# Patient Record
Sex: FEMALE | Race: WHITE
[De-identification: names, ages, dates, MRNs, and addresses within clinical notes are randomized per-mention and may not be internally consistent; named-entity substitution may affect disease eponyms.]

---

## 2017-01-04 PROBLEM — Z00.00 ENCOUNTER FOR PREVENTIVE HEALTH EXAMINATION: Status: ACTIVE | Noted: 2017-01-04

## 2017-02-02 ENCOUNTER — APPOINTMENT (OUTPATIENT)
Dept: RHEUMATOLOGY | Facility: CLINIC | Age: 82
End: 2017-02-02

## 2017-02-02 VITALS
SYSTOLIC BLOOD PRESSURE: 122 MMHG | DIASTOLIC BLOOD PRESSURE: 58 MMHG | HEIGHT: 60.5 IN | BODY MASS INDEX: 24.67 KG/M2 | WEIGHT: 129 LBS | HEART RATE: 86 BPM | TEMPERATURE: 97.9 F

## 2017-02-02 DIAGNOSIS — Z86.39 PERSONAL HISTORY OF OTHER ENDOCRINE, NUTRITIONAL AND METABOLIC DISEASE: ICD-10-CM

## 2017-02-02 DIAGNOSIS — Z82.3 FAMILY HISTORY OF STROKE: ICD-10-CM

## 2017-02-02 DIAGNOSIS — Z86.79 PERSONAL HISTORY OF OTHER DISEASES OF THE CIRCULATORY SYSTEM: ICD-10-CM

## 2017-02-02 DIAGNOSIS — Z78.9 OTHER SPECIFIED HEALTH STATUS: ICD-10-CM

## 2017-02-02 RX ORDER — LIDOCAINE HYDROCHLORIDE 10 MG/ML
1 INJECTION, SOLUTION INFILTRATION; PERINEURAL
Qty: 0 | Refills: 0 | Status: COMPLETED | OUTPATIENT
Start: 2017-02-02

## 2017-02-02 RX ORDER — DICLOFENAC SODIUM 20 MG/G
2 SOLUTION TOPICAL
Qty: 1 | Refills: 3 | Status: ACTIVE | COMMUNITY
Start: 2017-02-02 | End: 1900-01-01

## 2017-02-02 RX ORDER — METHYLPRED ACET/NACL,ISO-OS/PF 40 MG/ML
40 VIAL (ML) INJECTION
Qty: 1 | Refills: 0 | Status: COMPLETED | OUTPATIENT
Start: 2017-02-02

## 2017-02-02 RX ADMIN — METHYLPREDNISOLONE ACETATE 0 MG/ML: 40 INJECTION, SUSPENSION INTRA-ARTICULAR; INTRALESIONAL; INTRAMUSCULAR; SOFT TISSUE at 00:00

## 2017-02-02 RX ADMIN — LIDOCAINE HYDROCHLORIDE 0 %: 10 INJECTION, SOLUTION INFILTRATION; PERINEURAL at 00:00

## 2017-02-04 PROBLEM — Z82.3 FAMILY HISTORY OF CEREBROVASCULAR ACCIDENT (CVA): Status: ACTIVE | Noted: 2017-02-02

## 2017-02-04 PROBLEM — Z78.9 NON-SMOKER: Status: ACTIVE | Noted: 2017-02-02

## 2017-02-04 PROBLEM — Z86.79 HISTORY OF HYPERTENSION: Status: RESOLVED | Noted: 2017-02-04 | Resolved: 2017-02-04

## 2017-02-04 PROBLEM — Z86.39 HISTORY OF HYPERCHOLESTEROLEMIA: Status: RESOLVED | Noted: 2017-02-04 | Resolved: 2017-02-04

## 2017-04-06 ENCOUNTER — APPOINTMENT (OUTPATIENT)
Dept: RHEUMATOLOGY | Facility: CLINIC | Age: 82
End: 2017-04-06

## 2017-04-06 VITALS
DIASTOLIC BLOOD PRESSURE: 72 MMHG | TEMPERATURE: 97.7 F | HEIGHT: 60.5 IN | SYSTOLIC BLOOD PRESSURE: 132 MMHG | HEART RATE: 92 BPM

## 2017-04-06 RX ORDER — METHYLPRED ACET/NACL,ISO-OS/PF 40 MG/ML
40 VIAL (ML) INJECTION
Qty: 1 | Refills: 0 | Status: COMPLETED | OUTPATIENT
Start: 2017-04-06

## 2017-04-06 RX ORDER — LIDOCAINE HYDROCHLORIDE 10 MG/ML
1 INJECTION, SOLUTION INFILTRATION; PERINEURAL
Qty: 0 | Refills: 0 | Status: COMPLETED | OUTPATIENT
Start: 2017-04-06

## 2017-04-06 RX ADMIN — Medication 0 %: at 00:00

## 2017-04-06 RX ADMIN — METHYLPREDNISOLONE ACETATE 0 MG/ML: 40 INJECTION, SUSPENSION INTRA-ARTICULAR; INTRALESIONAL; INTRAMUSCULAR; SOFT TISSUE at 00:00

## 2017-07-06 ENCOUNTER — APPOINTMENT (OUTPATIENT)
Dept: RHEUMATOLOGY | Facility: CLINIC | Age: 82
End: 2017-07-06

## 2017-07-06 VITALS
BODY MASS INDEX: 24.92 KG/M2 | HEIGHT: 61 IN | TEMPERATURE: 98.5 F | OXYGEN SATURATION: 98 % | WEIGHT: 132 LBS | HEART RATE: 107 BPM | DIASTOLIC BLOOD PRESSURE: 76 MMHG | RESPIRATION RATE: 18 BRPM | SYSTOLIC BLOOD PRESSURE: 142 MMHG

## 2017-07-06 DIAGNOSIS — F15.90 OTHER STIMULANT USE, UNSPECIFIED, UNCOMPLICATED: ICD-10-CM

## 2017-07-07 PROBLEM — F15.90 CAFFEINE USE: Status: ACTIVE | Noted: 2017-02-02

## 2017-11-09 ENCOUNTER — APPOINTMENT (OUTPATIENT)
Dept: RHEUMATOLOGY | Facility: CLINIC | Age: 82
End: 2017-11-09
Payer: MEDICARE

## 2017-11-09 VITALS
HEART RATE: 106 BPM | HEIGHT: 61 IN | DIASTOLIC BLOOD PRESSURE: 68 MMHG | BODY MASS INDEX: 24.17 KG/M2 | SYSTOLIC BLOOD PRESSURE: 132 MMHG | RESPIRATION RATE: 16 BRPM | WEIGHT: 128 LBS | TEMPERATURE: 98.3 F | OXYGEN SATURATION: 97 %

## 2017-11-09 PROCEDURE — 20526 THER INJECTION CARP TUNNEL: CPT | Mod: LT

## 2017-11-09 PROCEDURE — 99214 OFFICE O/P EST MOD 30 MIN: CPT | Mod: 25

## 2017-11-09 RX ORDER — LIDOCAINE HYDROCHLORIDE 10 MG/ML
1 INJECTION, SOLUTION INFILTRATION; PERINEURAL
Qty: 0 | Refills: 0 | Status: COMPLETED | OUTPATIENT
Start: 2017-11-09

## 2017-11-09 RX ORDER — METHYLPRED ACET/NACL,ISO-OS/PF 40 MG/ML
40 VIAL (ML) INJECTION
Qty: 1 | Refills: 0 | Status: COMPLETED | OUTPATIENT
Start: 2017-11-09

## 2017-11-09 RX ADMIN — METHYLPREDNISOLONE ACETATE 0 MG/ML: 40 INJECTION, SUSPENSION INTRA-ARTICULAR; INTRALESIONAL; INTRAMUSCULAR; SOFT TISSUE at 00:00

## 2017-11-09 RX ADMIN — Medication 0 %: at 00:00

## 2018-03-15 ENCOUNTER — APPOINTMENT (OUTPATIENT)
Dept: RHEUMATOLOGY | Facility: CLINIC | Age: 83
End: 2018-03-15
Payer: MEDICARE

## 2018-03-15 VITALS
RESPIRATION RATE: 18 BRPM | HEART RATE: 102 BPM | WEIGHT: 135 LBS | DIASTOLIC BLOOD PRESSURE: 66 MMHG | OXYGEN SATURATION: 95 % | HEIGHT: 61 IN | SYSTOLIC BLOOD PRESSURE: 148 MMHG | TEMPERATURE: 98.3 F | BODY MASS INDEX: 25.49 KG/M2

## 2018-03-15 PROCEDURE — 99213 OFFICE O/P EST LOW 20 MIN: CPT

## 2018-08-09 ENCOUNTER — APPOINTMENT (OUTPATIENT)
Dept: RHEUMATOLOGY | Facility: CLINIC | Age: 83
End: 2018-08-09
Payer: MEDICARE

## 2018-08-09 VITALS
OXYGEN SATURATION: 96 % | TEMPERATURE: 98.3 F | RESPIRATION RATE: 16 BRPM | BODY MASS INDEX: 23.79 KG/M2 | HEIGHT: 61 IN | SYSTOLIC BLOOD PRESSURE: 143 MMHG | DIASTOLIC BLOOD PRESSURE: 71 MMHG | HEART RATE: 110 BPM | WEIGHT: 126 LBS

## 2018-08-09 PROCEDURE — 99214 OFFICE O/P EST MOD 30 MIN: CPT | Mod: 25

## 2018-08-09 PROCEDURE — 20526 THER INJECTION CARP TUNNEL: CPT | Mod: RT

## 2018-08-09 RX ORDER — METHYLPRED ACET/NACL,ISO-OS/PF 40 MG/ML
40 VIAL (ML) INJECTION
Qty: 1 | Refills: 0 | Status: COMPLETED | OUTPATIENT
Start: 2018-08-09 | End: 2018-08-09

## 2018-08-09 RX ORDER — METHYLPRED ACET/NACL,ISO-OS/PF 40 MG/ML
40 VIAL (ML) INJECTION
Qty: 1 | Refills: 0 | Status: COMPLETED | OUTPATIENT
Start: 2018-08-09

## 2018-08-09 RX ADMIN — METHYLPREDNISOLONE ACETATE 0 MG/ML: 40 INJECTION, SUSPENSION INTRA-ARTICULAR; INTRALESIONAL; INTRAMUSCULAR; SOFT TISSUE at 00:00

## 2018-08-09 RX ADMIN — Medication 0 MG/ML: at 00:00

## 2018-08-09 RX ADMIN — LIDOCAINE HYDROCHLORIDE 0 %: 10 INJECTION, SOLUTION INFILTRATION; PERINEURAL at 00:00

## 2018-08-09 RX ADMIN — Medication 0 %: at 00:00

## 2018-09-17 ENCOUNTER — APPOINTMENT (OUTPATIENT)
Dept: INTERNAL MEDICINE | Facility: CLINIC | Age: 83
End: 2018-09-17
Payer: MEDICARE

## 2018-09-17 VITALS
DIASTOLIC BLOOD PRESSURE: 85 MMHG | HEIGHT: 61 IN | BODY MASS INDEX: 23.98 KG/M2 | HEART RATE: 111 BPM | SYSTOLIC BLOOD PRESSURE: 150 MMHG | TEMPERATURE: 97.7 F | RESPIRATION RATE: 17 BRPM | OXYGEN SATURATION: 96 % | WEIGHT: 127 LBS

## 2018-09-17 DIAGNOSIS — Z83.3 FAMILY HISTORY OF DIABETES MELLITUS: ICD-10-CM

## 2018-09-17 DIAGNOSIS — M25.569 PAIN IN UNSPECIFIED KNEE: ICD-10-CM

## 2018-09-17 DIAGNOSIS — Z87.898 PERSONAL HISTORY OF OTHER SPECIFIED CONDITIONS: ICD-10-CM

## 2018-09-17 DIAGNOSIS — Z80.9 FAMILY HISTORY OF MALIGNANT NEOPLASM, UNSPECIFIED: ICD-10-CM

## 2018-09-17 DIAGNOSIS — H40.9 UNSPECIFIED GLAUCOMA: ICD-10-CM

## 2018-09-17 DIAGNOSIS — Z87.39 PERSONAL HISTORY OF OTHER DISEASES OF THE MUSCULOSKELETAL SYSTEM AND CONNECTIVE TISSUE: ICD-10-CM

## 2018-09-17 DIAGNOSIS — Z82.49 FAMILY HISTORY OF ISCHEMIC HEART DISEASE AND OTHER DISEASES OF THE CIRCULATORY SYSTEM: ICD-10-CM

## 2018-09-17 DIAGNOSIS — Z83.49 FAMILY HISTORY OF OTHER ENDOCRINE, NUTRITIONAL AND METABOLIC DISEASES: ICD-10-CM

## 2018-09-17 PROCEDURE — 99204 OFFICE O/P NEW MOD 45 MIN: CPT

## 2018-09-17 RX ORDER — EZETIMIBE 10 MG/1
10 TABLET ORAL
Refills: 0 | Status: DISCONTINUED | COMMUNITY

## 2018-09-17 RX ORDER — HYDROCHLOROTHIAZIDE 12.5 MG/1
12.5 CAPSULE ORAL DAILY
Qty: 30 | Refills: 0 | Status: ACTIVE | COMMUNITY
Start: 2017-04-06

## 2018-09-17 RX ORDER — ESOMEPRAZOLE MAGNESIUM 40 MG/1
40 CAPSULE, DELAYED RELEASE ORAL
Refills: 0 | Status: ACTIVE | COMMUNITY

## 2018-09-17 RX ORDER — NAPROXEN 500 MG/1
500 TABLET ORAL
Refills: 0 | Status: ACTIVE | COMMUNITY

## 2018-09-17 RX ORDER — VITAMIN B COMPLEX
CAPSULE ORAL
Refills: 0 | Status: ACTIVE | COMMUNITY

## 2018-09-17 RX ORDER — IBUPROFEN 200 MG/1
TABLET, COATED ORAL
Refills: 0 | Status: ACTIVE | COMMUNITY

## 2018-09-17 RX ORDER — CALCIUM CITRATE/VITAMIN D3 315MG-6.25
TABLET ORAL
Refills: 0 | Status: ACTIVE | COMMUNITY

## 2018-09-17 RX ORDER — METOPROLOL TARTRATE AND HYDROCHLOROTHIAZIDE 50; 25 MG/1; MG/1
TABLET ORAL
Refills: 0 | Status: DISCONTINUED | COMMUNITY

## 2018-09-17 RX ORDER — CHOLECALCIFEROL (VITAMIN D3) 25 MCG
TABLET ORAL
Refills: 0 | Status: ACTIVE | COMMUNITY

## 2018-09-17 RX ORDER — B-COMPLEX WITH VITAMIN C
TABLET ORAL
Refills: 0 | Status: ACTIVE | COMMUNITY

## 2018-09-17 RX ORDER — ROSUVASTATIN CALCIUM 40 MG/1
40 TABLET, FILM COATED ORAL
Qty: 30 | Refills: 5 | Status: ACTIVE | COMMUNITY
Start: 2018-09-17

## 2018-09-17 RX ORDER — METOPROLOL SUCCINATE 100 MG/1
100 TABLET, EXTENDED RELEASE ORAL DAILY
Qty: 90 | Refills: 2 | Status: ACTIVE | COMMUNITY
Start: 2018-09-17

## 2018-09-17 RX ORDER — TRAVOPROST 0.04 MG/ML
0 SOLUTION OPHTHALMIC
Refills: 0 | Status: ACTIVE | COMMUNITY

## 2018-09-17 RX ORDER — DM/PE/ACETAMINOPHEN/CHLORPHENR 10-5-325-2
TABLET, SEQUENTIAL ORAL
Refills: 0 | Status: ACTIVE | COMMUNITY

## 2018-09-17 RX ORDER — MAGNESIUM OXIDE 500 MG
500 TABLET ORAL
Refills: 0 | Status: ACTIVE | COMMUNITY

## 2018-09-18 NOTE — HEALTH RISK ASSESSMENT
[Good] : ~his/her~  mood as  good [No falls in past year] : Patient reported no falls in the past year [0] : 2) Feeling down, depressed, or hopeless: Not at all (0) [HIV Test offered] : HIV Test offered [Hepatitis C test offered] : Hepatitis C test offered [Fully functional (bathing, dressing, toileting, transferring, walking, feeding)] : Fully functional (bathing, dressing, toileting, transferring, walking, feeding) [Fully functional (using the telephone, shopping, preparing meals, housekeeping, doing laundry, using] : Fully functional and needs no help or supervision to perform IADLs (using the telephone, shopping, preparing meals, housekeeping, doing laundry, using transportation, managing medications and managing finances) [Discussed at today's visit] : Advance Directives Discussed at today's visit [] : No [[id number]] : 0 [Change in mental status noted] : No change in mental status noted [Language] : denies difficulty with language [Learning/Retaining New Information] : denies difficulty learning/retaining new information [Handling Complex Tasks] : denies difficulty handling complex tasks [Reasoning] : denies difficulty with reasoning [Spatial Ability and Orientation] : denies difficulty with spatial ability and orientation [None] : None [Alone] : lives alone [] :  [MammogramDate] : 01/09 [ColonoscopyDate] : 01/15

## 2018-09-18 NOTE — PHYSICAL EXAM
[No Acute Distress] : no acute distress [Well Nourished] : well nourished [Well Developed] : well developed [Well-Appearing] : well-appearing [Normal Sclera/Conjunctiva] : normal sclera/conjunctiva [Normal Outer Ear/Nose] : the outer ears and nose were normal in appearance [Normal Oropharynx] : the oropharynx was normal [Supple] : supple [No Lymphadenopathy] : no lymphadenopathy [Thyroid Normal, No Nodules] : the thyroid was normal and there were no nodules present [No Respiratory Distress] : no respiratory distress  [Clear to Auscultation] : lungs were clear to auscultation bilaterally [No Accessory Muscle Use] : no accessory muscle use [Normal Rate] : normal rate  [Regular Rhythm] : with a regular rhythm [Normal S1, S2] : normal S1 and S2 [No Murmur] : no murmur heard [No Edema] : there was no peripheral edema [Soft] : abdomen soft [Non Tender] : non-tender [Non-distended] : non-distended [No Masses] : no abdominal mass palpated [No HSM] : no HSM [Normal Bowel Sounds] : normal bowel sounds [Normal Posterior Cervical Nodes] : no posterior cervical lymphadenopathy [Normal Anterior Cervical Nodes] : no anterior cervical lymphadenopathy [No CVA Tenderness] : no CVA  tenderness [No Spinal Tenderness] : no spinal tenderness [No Rash] : no rash [Normal Gait] : normal gait [Coordination Grossly Intact] : coordination grossly intact [No Focal Deficits] : no focal deficits [Normal Affect] : the affect was normal [Alert and Oriented x3] : oriented to person, place, and time [Normal Insight/Judgement] : insight and judgment were intact

## 2018-09-18 NOTE — ASSESSMENT
[FreeTextEntry1] : 88 year-old woman with history of HTN, HLD, R CTS, DJD stable on the current regimen, diet and lifestyle modifications as counseled. She will forward prior results of her blood tests to the office. She will get her Flu vaccine at the pharmacy (not available in our office yet).  The plan as ordered.  F/u 3 months or sooner prn.

## 2018-09-18 NOTE — HISTORY OF PRESENT ILLNESS
[FreeTextEntry1] : RIYA COLON is a 88 year-old female who presents to establish medical care.  [de-identified] : 88F with h/o stable HTN, HLD< R carpal tunnel syndrome, DJD hands, knees presents to [facility] care.  She reports being in good general health.  Performs her ADLs on her own.  Stays active - shopping, goes to [employer] every Sunday.  She reports compliance with taking her medications. She denies chest pain, SOB, dizziness, weakness, palpitations, lightheadedness or syncope   \par \par PMH:\par 1.  HTN, HLD - saw her Cardiologist last week.  TTE, EKG, labs unremarkable. Takes Crestor 40 mg daily,  Metoprolol 100 mg daily, HCTZ 12.5 mg daily.  \par \par Shx - lives alone.   passed 12 years ago.  No children.  \par \par Providers:  Cardiologist Dr. Morales Richardson MD  [facility]  [phone]\par Ophthalmologist:    Dr. Cristhian Ochoa MD

## 2018-12-13 ENCOUNTER — APPOINTMENT (OUTPATIENT)
Dept: INTERNAL MEDICINE | Facility: CLINIC | Age: 83
End: 2018-12-13

## 2019-02-04 ENCOUNTER — APPOINTMENT (OUTPATIENT)
Dept: INTERNAL MEDICINE | Facility: CLINIC | Age: 84
End: 2019-02-04
Payer: MEDICARE

## 2019-02-04 VITALS
OXYGEN SATURATION: 95 % | WEIGHT: 129 LBS | BODY MASS INDEX: 24.35 KG/M2 | HEART RATE: 108 BPM | DIASTOLIC BLOOD PRESSURE: 82 MMHG | SYSTOLIC BLOOD PRESSURE: 162 MMHG | HEIGHT: 61 IN | TEMPERATURE: 97.7 F | RESPIRATION RATE: 17 BRPM

## 2019-02-04 DIAGNOSIS — E78.5 HYPERLIPIDEMIA, UNSPECIFIED: ICD-10-CM

## 2019-02-04 PROCEDURE — 99214 OFFICE O/P EST MOD 30 MIN: CPT

## 2019-02-04 NOTE — PHYSICAL EXAM
[No Acute Distress] : no acute distress [Well Nourished] : well nourished [Well Developed] : well developed [Well-Appearing] : well-appearing [Normal Sclera/Conjunctiva] : normal sclera/conjunctiva [Normal Outer Ear/Nose] : the outer ears and nose were normal in appearance [Supple] : supple [No Lymphadenopathy] : no lymphadenopathy [Thyroid Normal, No Nodules] : the thyroid was normal and there were no nodules present [No Respiratory Distress] : no respiratory distress  [Clear to Auscultation] : lungs were clear to auscultation bilaterally [No Accessory Muscle Use] : no accessory muscle use [Normal Rate] : normal rate  [Regular Rhythm] : with a regular rhythm [Normal S1, S2] : normal S1 and S2 [No Murmur] : no murmur heard [No Edema] : there was no peripheral edema [Soft] : abdomen soft [Non Tender] : non-tender [Non-distended] : non-distended [No Masses] : no abdominal mass palpated [No HSM] : no HSM [Normal Bowel Sounds] : normal bowel sounds [Normal Posterior Cervical Nodes] : no posterior cervical lymphadenopathy [Normal Anterior Cervical Nodes] : no anterior cervical lymphadenopathy [No CVA Tenderness] : no CVA  tenderness [No Spinal Tenderness] : no spinal tenderness [No Rash] : no rash [Coordination Grossly Intact] : coordination grossly intact [No Focal Deficits] : no focal deficits [Normal Affect] : the affect was normal [Alert and Oriented x3] : oriented to person, place, and time [Normal Insight/Judgement] : insight and judgment were intact [Normal Mood] : the mood was normal [de-identified] : ambulates with a came

## 2019-02-04 NOTE — ASSESSMENT
[FreeTextEntry1] : 89 year-old woman with history of HTN, HLD, R carpal tunnel syndrome, DJD hands, knees stable on the current regimen, diet and lifestyle modifications as counseled. Recent blood test results with her Cardiologist to be forwarded to our office.  She declines labs today.  She declines Flu vaccine today.  The plan as ordered.

## 2019-02-04 NOTE — HISTORY OF PRESENT ILLNESS
[de-identified] : 89F with h/o stable HTN, HLD,  R carpal tunnel syndrome, DJD hands, knees presents for medical follow-up.  She reports feeling well in general - denies chest pain, SOB, dizziness, weakness, palpitations, lightheadedness or syncope.  Stayed indoors mostly during recent cold weather.  Reports she has helpful neighbors who checked in on her.  Remained active while indoors walking down her hallway, inside the apartment.  Joint pain stable.  Appetite has been good, bowel movements regular, no recent falls.  She reports compliance with taking her medications.

## 2019-03-07 ENCOUNTER — APPOINTMENT (OUTPATIENT)
Dept: RHEUMATOLOGY | Facility: CLINIC | Age: 84
End: 2019-03-07

## 2019-04-04 ENCOUNTER — APPOINTMENT (OUTPATIENT)
Dept: RHEUMATOLOGY | Facility: CLINIC | Age: 84
End: 2019-04-04
Payer: MEDICARE

## 2019-04-04 VITALS
HEIGHT: 61 IN | BODY MASS INDEX: 24.17 KG/M2 | SYSTOLIC BLOOD PRESSURE: 144 MMHG | HEART RATE: 100 BPM | TEMPERATURE: 98 F | DIASTOLIC BLOOD PRESSURE: 81 MMHG | OXYGEN SATURATION: 96 % | RESPIRATION RATE: 17 BRPM | WEIGHT: 128 LBS

## 2019-04-04 DIAGNOSIS — M71.20 SYNOVIAL CYST OF POPLITEAL SPACE [BAKER], UNSPECIFIED KNEE: ICD-10-CM

## 2019-04-04 PROCEDURE — 99214 OFFICE O/P EST MOD 30 MIN: CPT | Mod: 25

## 2019-04-04 PROCEDURE — 76881 US COMPL JOINT R-T W/IMG: CPT | Mod: LT

## 2019-04-05 NOTE — REVIEW OF SYSTEMS
[Lower Ext Edema] : lower extremity edema [Arthralgias] : arthralgias [Joint Pain] : joint pain [Joint Stiffness] : joint stiffness [Sleep Disturbances] : sleep disturbances [Fever] : no fever [Chills] : no chills [Eye Pain] : no eye pain [Palpitations] : no palpitations [SOB on Exertion] : no shortness of breath during exertion [Heartburn] : no heartburn [Joint Swelling] : no joint swelling [Skin Lesions] : no skin lesions [Depression] : no depression [Feelings Of Weakness] : no feelings of weakness [Easy Bleeding] : no tendency for easy bleeding [Easy Bruising] : no tendency for easy bruising [de-identified] : intermittent instability

## 2019-04-05 NOTE — HISTORY OF PRESENT ILLNESS
[FreeTextEntry1] : Patient reports worsening pain over the left knee over the last 2 weeks. She reports instability symptoms especially after walking one block which is unusual for her due to her activity nature.   She does not recall inciting factors or change in diet. She reports intermittent stiffness and mild fullness of the knee.  She reports arnica cream as landing mild relief with intermittent use of Advil helps.  \par After receiving cortisone injections to the wrist for carpal tunnel, she does not note accompanying paresthesias.  She does note stiffness over the digits especially first thing in the morning last ing a few mins.  She notes this does not interfere with performing continuous household chores in the home including wringing out hand washed clothes and sweeping. She otherwise denies motor/sensory disturbances or fevers.\par

## 2019-04-05 NOTE — ASSESSMENT
[FreeTextEntry1] : Patient with DJD of the knees and hands;  improved Carpal Tunnel syndrome; r/o CPPD:\par \par Patient will benefit from continued physical therapy at home to help with joint mobility and muscle strengthening of the knees.  Quadriceps strengthening exercises demonstrated in the office.   Viscosupplementation has been encouraged to provide additional lubrication and joint support.  US visualized popliteal cyst; patient would like to defer arthrocentesis, rec NSAID use for seven days and ice compressions.  Crystal disease not detected on US today.    Knee films rec.  Risks and benefits were discussed and include but not limited to renal/hepatic damage and GI ulceration and bleeding. Advised to take with food to limit stomach upset as well as warned to stop the medication if worsening gastric pain, black/red vomit, black/red stools or any other medical concern.\par Patient advised continued wrist splint for joint stabilization.  Vitamin supplementation including Magnesium, Zinc and Calcium have been recommended. Wrist splint recommended.Paraffin wax recommended for hand OA along with Glucosamine supplements/  \par She is in agreement with the above plan and will return in four months' time.\par \par \par \par

## 2019-04-05 NOTE — PROCEDURE
[Betadine] : betadine solution [25 gauge 1 inch] : A 25 gauge 1 inch needle was used [Depomedrol ___ mg] : Depomedrol [unfilled] mg [___ml 1% Lidocaine] : [unfilled] ml of 1% lidocaine [FreeTextEntry1] : DATE: 4/419\par Study Type: Complete study\par Indication: sudden pain in joint \par Study Site: Left knee\par Equipment:  Sonosite 10-5 MHz linear transducer  \par Findings: Orthogonal views of the anterior, posterior, medial and lateral\par aspects of the knee were obtained in grey scale and\par  There was hypoechoic pocket in the suprapatellar fossa in long and short  axis with \par cortical irregularities , marginal osteophytes and joint space narrowing.  No muscle or tendon or tendon sheath abnormalities seen in the anterior compartments. Moderate sized cyst appreciated over posterior compartment with semimembranous and gastrocnemius head intact.  \par Impressions: Severe Degenerative Joint Disease. No specific findings to suggest crystal related arthritis.\par

## 2019-04-05 NOTE — PHYSICAL EXAM
[General Appearance - Alert] : alert [General Appearance - In No Acute Distress] : in no acute distress [Sclera] : the sclera and conjunctiva were normal [Oropharynx] : the oropharynx was normal [Respiration, Rhythm And Depth] : normal respiratory rhythm and effort [Heart Rate And Rhythm] : heart rate was normal and rhythm regular [Edema] : there was no peripheral edema [No Spinal Tenderness] : no spinal tenderness [Musculoskeletal - Swelling] : no joint swelling seen [Motor Tone] : muscle strength and tone were normal [] : no rash [Motor Exam] : the motor exam was normal [Oriented To Time, Place, And Person] : oriented to person, place, and time [Impaired Insight] : insight and judgment were intact [FreeTextEntry1] : +Tinel/Phalen testing

## 2019-11-14 ENCOUNTER — APPOINTMENT (OUTPATIENT)
Dept: RHEUMATOLOGY | Facility: CLINIC | Age: 84
End: 2019-11-14
Payer: MEDICARE

## 2019-11-14 VITALS
RESPIRATION RATE: 16 BRPM | TEMPERATURE: 97.6 F | WEIGHT: 122 LBS | HEART RATE: 121 BPM | HEIGHT: 61 IN | DIASTOLIC BLOOD PRESSURE: 75 MMHG | OXYGEN SATURATION: 93 % | SYSTOLIC BLOOD PRESSURE: 161 MMHG | BODY MASS INDEX: 23.03 KG/M2

## 2019-11-14 DIAGNOSIS — G56.00 CARPAL TUNNEL SYNDROME, UNSPECIFIED UPPER LIMB: ICD-10-CM

## 2019-11-14 PROCEDURE — 99213 OFFICE O/P EST LOW 20 MIN: CPT

## 2019-11-14 NOTE — REVIEW OF SYSTEMS
[Fever] : no fever [Chills] : no chills [Eye Pain] : no eye pain [Palpitations] : no palpitations [Lower Ext Edema] : lower extremity edema [SOB on Exertion] : no shortness of breath during exertion [Heartburn] : no heartburn [Arthralgias] : arthralgias [Joint Pain] : joint pain [Joint Swelling] : no joint swelling [Joint Stiffness] : joint stiffness [Skin Lesions] : no skin lesions [Sleep Disturbances] : sleep disturbances [Feelings Of Weakness] : no feelings of weakness [Depression] : no depression [Easy Bruising] : no tendency for easy bruising [Easy Bleeding] : no tendency for easy bleeding [de-identified] : intermittent instability

## 2019-11-14 NOTE — PHYSICAL EXAM
[General Appearance - Alert] : alert [General Appearance - In No Acute Distress] : in no acute distress [Sclera] : the sclera and conjunctiva were normal [Oropharynx] : the oropharynx was normal [Respiration, Rhythm And Depth] : normal respiratory rhythm and effort [Heart Rate And Rhythm] : heart rate was normal and rhythm regular [Edema] : there was no peripheral edema [No Spinal Tenderness] : no spinal tenderness [Musculoskeletal - Swelling] : no joint swelling seen [Motor Tone] : muscle strength and tone were normal [FreeTextEntry1] : Scattered Heberden and Krys nodes, epicondyles without tenderness, mild restriction of lateral neck rotation, left > right, knees with mild tenderness along joint lines with valgus deformity b/l with appropriate external rotation of the hips appreciated.  [] : no rash [Motor Exam] : the motor exam was normal [Oriented To Time, Place, And Person] : oriented to person, place, and time [Impaired Insight] : insight and judgment were intact

## 2019-11-14 NOTE — HISTORY OF PRESENT ILLNESS
[FreeTextEntry1] : Patient reports improvement in knee pain since the last few encounters. Though she explains intermittent instability symptoms especially after walking for prolonged periods; she reports however having applied topical creams as well as taking a vegetarian based a vitamin which has additionally addressed tingling and paresthesias experienced in the hands. She explains having celebrated her recent 89th birthday and finds no new complaints as she is performing ADLs without difficulty as well as ambulating on a daily basis, at a moderate pace with the assistance of a cane.  She otherwise denies motor/sensory disturbances or fevers.\par

## 2019-11-14 NOTE — ASSESSMENT
[FreeTextEntry1] : Patient with DJD of the knees and hands;  improved Carpal Tunnel syndrome; \par Patient will benefit from continued physical therapy at home to help with joint mobility and muscle strengthening of the knees.  Quadriceps strengthening exercises demonstrated in the office.   Viscosupplementation has been encouraged to provide additional lubrication and joint support.  \par Patient advised continued wrist splint for joint stabilization if with resurfacing of pain.  Vitamin supplementation including Magnesium, Zinc and Calcium have been recommended. Wrist splint recommended.Paraffin wax recommended for hand OA along with Glucosamine supplements/  \par She is in agreement with the above plan and will return in four months' time.\par \par \par \par

## 2020-03-12 ENCOUNTER — APPOINTMENT (OUTPATIENT)
Dept: RHEUMATOLOGY | Facility: CLINIC | Age: 85
End: 2020-03-12

## 2021-02-11 ENCOUNTER — APPOINTMENT (OUTPATIENT)
Dept: RHEUMATOLOGY | Facility: CLINIC | Age: 86
End: 2021-02-11

## 2021-03-04 ENCOUNTER — APPOINTMENT (OUTPATIENT)
Dept: RHEUMATOLOGY | Facility: CLINIC | Age: 86
End: 2021-03-04
Payer: MEDICARE

## 2021-03-04 VITALS
HEIGHT: 61 IN | OXYGEN SATURATION: 99 % | RESPIRATION RATE: 17 BRPM | SYSTOLIC BLOOD PRESSURE: 122 MMHG | TEMPERATURE: 98.1 F | WEIGHT: 108 LBS | DIASTOLIC BLOOD PRESSURE: 71 MMHG | BODY MASS INDEX: 20.39 KG/M2 | HEART RATE: 129 BPM

## 2021-03-04 DIAGNOSIS — M17.0 BILATERAL PRIMARY OSTEOARTHRITIS OF KNEE: ICD-10-CM

## 2021-03-04 DIAGNOSIS — R60.9 EDEMA, UNSPECIFIED: ICD-10-CM

## 2021-03-04 DIAGNOSIS — M19.049 PRIMARY OSTEOARTHRITIS, UNSPECIFIED HAND: ICD-10-CM

## 2021-03-04 DIAGNOSIS — L30.9 DERMATITIS, UNSPECIFIED: ICD-10-CM

## 2021-03-04 DIAGNOSIS — I10 ESSENTIAL (PRIMARY) HYPERTENSION: ICD-10-CM

## 2021-03-04 PROCEDURE — 99072 ADDL SUPL MATRL&STAF TM PHE: CPT

## 2021-03-04 PROCEDURE — 99214 OFFICE O/P EST MOD 30 MIN: CPT

## 2021-03-04 RX ORDER — HYDROCHLOROTHIAZIDE 12.5 MG/1
12.5 CAPSULE ORAL DAILY
Qty: 30 | Refills: 0 | Status: ACTIVE | COMMUNITY
Start: 2021-03-04 | End: 1900-01-01

## 2021-03-04 RX ORDER — LANOLIN ALCOHOL/MO/W.PET/CERES
CREAM (GRAM) TOPICAL
Qty: 1 | Refills: 6 | Status: ACTIVE | COMMUNITY
Start: 2021-03-04 | End: 1900-01-01

## 2021-03-04 RX ORDER — CEPHALEXIN 250 MG/1
250 TABLET ORAL
Qty: 10 | Refills: 0 | Status: ACTIVE | COMMUNITY
Start: 2021-03-04 | End: 1900-01-01

## 2021-03-07 PROBLEM — M19.049 OSTEOARTHRITIS, HAND: Status: ACTIVE | Noted: 2018-03-15

## 2021-03-07 PROBLEM — M17.0 OSTEOARTHRITIS OF BOTH KNEES: Status: ACTIVE | Noted: 2017-02-02

## 2021-03-07 PROBLEM — L30.9 DERMATITIS: Status: ACTIVE | Noted: 2021-03-04

## 2021-03-07 NOTE — ASSESSMENT
[FreeTextEntry1] : Patient with DJD of the knees and hands;  LE swelling: \par Rec diuretic for LE swelling and HTN.  Spoke with Ophthalmology to confirm no drug reaction with glaucoma medications.  Short course of Keflex given for bright LE erythema in elderly patient along with Eucerin + 30% urea for moisturization.  Patient will benefit from continued physical therapy at home to help with joint mobility and muscle strengthening of the knees.  Quadriceps strengthening exercises demonstrated in the office.   Viscosupplementation has been encouraged to provide additional lubrication and joint support.  \par Paraffin wax recommended for hand OA along with Glucosamine supplements. \par PCP referral given as well. \par She is in agreement with the above plan and will return in four months' time.\par \par \par \par

## 2021-03-07 NOTE — REVIEW OF SYSTEMS
[Fever] : no fever [Chills] : no chills [Eye Pain] : no eye pain [Palpitations] : no palpitations [Lower Ext Edema] : lower extremity edema [SOB on Exertion] : no shortness of breath during exertion [Heartburn] : no heartburn [Arthralgias] : arthralgias [Joint Pain] : joint pain [Joint Swelling] : no joint swelling [Joint Stiffness] : joint stiffness [Skin Lesions] : no skin lesions [Sleep Disturbances] : sleep disturbances [Depression] : no depression [Feelings Of Weakness] : no feelings of weakness [Easy Bleeding] : no tendency for easy bleeding [Easy Bruising] : no tendency for easy bruising [de-identified] : intermittent instability

## 2021-03-07 NOTE — PHYSICAL EXAM
[General Appearance - Alert] : alert [General Appearance - In No Acute Distress] : in no acute distress [Sclera] : the sclera and conjunctiva were normal [] : the neck was supple [Respiration, Rhythm And Depth] : normal respiratory rhythm and effort [Heart Rate And Rhythm] : heart rate was normal and rhythm regular [No Spinal Tenderness] : no spinal tenderness [Musculoskeletal - Swelling] : no joint swelling seen [Motor Tone] : muscle strength and tone were normal [FreeTextEntry1] : erythema over the shins b/l ; xerotic [Motor Exam] : the motor exam was normal [Oriented To Time, Place, And Person] : oriented to person, place, and time [Impaired Insight] : insight and judgment were intact

## 2021-03-07 NOTE — HISTORY OF PRESENT ILLNESS
[FreeTextEntry1] : Patient reports LE swelling and rash, lending to difficulty with mobility.  She reports rash as pruritic.  She explains having stopped diuretic many months ago .  She reports having to use a walker for the first time long with assistance from an aide.    She denies fevers or chills or trauma to the LE  area.   She otherwise denies motor/sensory disturbances or fevers.\par

## 2021-10-06 PROBLEM — I10 ESSENTIAL HYPERTENSION: Status: ACTIVE | Noted: 2018-09-17

## 2025-01-01 ENCOUNTER — INPATIENT (INPATIENT)
Facility: HOSPITAL | Age: 89
LOS: 1 days | DRG: 293 | End: 2025-03-16
Attending: HOSPITALIST | Admitting: INTERNAL MEDICINE
Payer: MEDICARE

## 2025-01-01 VITALS
OXYGEN SATURATION: 99 % | DIASTOLIC BLOOD PRESSURE: 67 MMHG | WEIGHT: 87.08 LBS | RESPIRATION RATE: 22 BRPM | HEIGHT: 61 IN | HEART RATE: 148 BPM | SYSTOLIC BLOOD PRESSURE: 97 MMHG

## 2025-01-01 VITALS
TEMPERATURE: 97 F | HEART RATE: 106 BPM | OXYGEN SATURATION: 90 % | SYSTOLIC BLOOD PRESSURE: 88 MMHG | RESPIRATION RATE: 16 BRPM | DIASTOLIC BLOOD PRESSURE: 49 MMHG

## 2025-01-01 DIAGNOSIS — I50.23 ACUTE ON CHRONIC SYSTOLIC (CONGESTIVE) HEART FAILURE: ICD-10-CM

## 2025-01-01 DIAGNOSIS — N17.9 ACUTE KIDNEY FAILURE, UNSPECIFIED: ICD-10-CM

## 2025-01-01 DIAGNOSIS — Z29.9 ENCOUNTER FOR PROPHYLACTIC MEASURES, UNSPECIFIED: ICD-10-CM

## 2025-01-01 DIAGNOSIS — I50.9 HEART FAILURE, UNSPECIFIED: ICD-10-CM

## 2025-01-01 DIAGNOSIS — I48.91 UNSPECIFIED ATRIAL FIBRILLATION: ICD-10-CM

## 2025-01-01 DIAGNOSIS — Z51.5 ENCOUNTER FOR PALLIATIVE CARE: ICD-10-CM

## 2025-01-01 DIAGNOSIS — R74.01 ELEVATION OF LEVELS OF LIVER TRANSAMINASE LEVELS: ICD-10-CM

## 2025-01-01 DIAGNOSIS — E87.8 OTHER DISORDERS OF ELECTROLYTE AND FLUID BALANCE, NOT ELSEWHERE CLASSIFIED: ICD-10-CM

## 2025-01-01 DIAGNOSIS — A41.9 SEPSIS, UNSPECIFIED ORGANISM: ICD-10-CM

## 2025-01-01 LAB
ALBUMIN SERPL ELPH-MCNC: 3.2 G/DL — LOW (ref 3.5–5)
ALBUMIN SERPL ELPH-MCNC: 3.3 G/DL — LOW (ref 3.5–5)
ALP SERPL-CCNC: 293 U/L — HIGH (ref 40–120)
ALP SERPL-CCNC: 309 U/L — HIGH (ref 40–120)
ALT FLD-CCNC: 1057 U/L DA — HIGH (ref 10–60)
ALT FLD-CCNC: 1648 U/L DA — HIGH (ref 10–60)
ANION GAP SERPL CALC-SCNC: 8 MMOL/L — SIGNIFICANT CHANGE UP (ref 5–17)
ANION GAP SERPL CALC-SCNC: 9 MMOL/L — SIGNIFICANT CHANGE UP (ref 5–17)
APPEARANCE UR: CLEAR — SIGNIFICANT CHANGE UP
APTT BLD: 30.7 SEC — SIGNIFICANT CHANGE UP (ref 24.5–35.6)
AST SERPL-CCNC: 1372 U/L — HIGH (ref 10–40)
AST SERPL-CCNC: 743 U/L — HIGH (ref 10–40)
BACTERIA # UR AUTO: ABNORMAL /HPF
BASE EXCESS BLDA CALC-SCNC: -4.5 MMOL/L — LOW (ref -2–3)
BASOPHILS # BLD AUTO: 0.02 K/UL — SIGNIFICANT CHANGE UP (ref 0–0.2)
BASOPHILS NFR BLD AUTO: 0.2 % — SIGNIFICANT CHANGE UP (ref 0–2)
BILIRUB DIRECT SERPL-MCNC: 1.3 MG/DL — HIGH (ref 0–0.3)
BILIRUB SERPL-MCNC: 2 MG/DL — HIGH (ref 0.2–1.2)
BILIRUB SERPL-MCNC: 2.5 MG/DL — HIGH (ref 0.2–1.2)
BILIRUB UR-MCNC: NEGATIVE — SIGNIFICANT CHANGE UP
BLOOD GAS COMMENTS ARTERIAL: SIGNIFICANT CHANGE UP
BUN SERPL-MCNC: 77 MG/DL — HIGH (ref 7–18)
BUN SERPL-MCNC: 78 MG/DL — HIGH (ref 7–18)
CALCIUM SERPL-MCNC: 9.1 MG/DL — SIGNIFICANT CHANGE UP (ref 8.4–10.5)
CALCIUM SERPL-MCNC: 9.3 MG/DL — SIGNIFICANT CHANGE UP (ref 8.4–10.5)
CHLORIDE SERPL-SCNC: 97 MMOL/L — SIGNIFICANT CHANGE UP (ref 96–108)
CHLORIDE SERPL-SCNC: 99 MMOL/L — SIGNIFICANT CHANGE UP (ref 96–108)
CO2 SERPL-SCNC: 24 MMOL/L — SIGNIFICANT CHANGE UP (ref 22–31)
CO2 SERPL-SCNC: 25 MMOL/L — SIGNIFICANT CHANGE UP (ref 22–31)
COLOR SPEC: SIGNIFICANT CHANGE UP
COMMENT - URINE: SIGNIFICANT CHANGE UP
CREAT SERPL-MCNC: 2.04 MG/DL — HIGH (ref 0.5–1.3)
CREAT SERPL-MCNC: 2.14 MG/DL — HIGH (ref 0.5–1.3)
CULTURE RESULTS: SIGNIFICANT CHANGE UP
DIFF PNL FLD: ABNORMAL
EBV EA AB SER IA-ACNC: <5 U/ML — SIGNIFICANT CHANGE UP
EBV EA AB TITR SER IF: POSITIVE
EBV EA IGG SER-ACNC: NEGATIVE — SIGNIFICANT CHANGE UP
EBV NA IGG SER IA-ACNC: >600 U/ML — HIGH
EBV PATRN SPEC IB-IMP: SIGNIFICANT CHANGE UP
EBV VCA IGG AVIDITY SER QL IA: POSITIVE
EBV VCA IGM SER IA-ACNC: >160 U/ML — HIGH
EBV VCA IGM SER IA-ACNC: >750 U/ML — HIGH
EBV VCA IGM TITR FLD: POSITIVE
EGFR: 21 ML/MIN/1.73M2 — LOW
EGFR: 21 ML/MIN/1.73M2 — LOW
EGFR: 22 ML/MIN/1.73M2 — LOW
EGFR: 22 ML/MIN/1.73M2 — LOW
EOSINOPHIL # BLD AUTO: 0.01 K/UL — SIGNIFICANT CHANGE UP (ref 0–0.5)
EOSINOPHIL NFR BLD AUTO: 0.1 % — SIGNIFICANT CHANGE UP (ref 0–6)
EPI CELLS # UR: PRESENT
FERRITIN SERPL-MCNC: 382 NG/ML — HIGH (ref 13–330)
GAS PNL BLDA: SIGNIFICANT CHANGE UP
GLUCOSE BLDC GLUCOMTR-MCNC: 95 MG/DL — SIGNIFICANT CHANGE UP (ref 70–99)
GLUCOSE SERPL-MCNC: 109 MG/DL — HIGH (ref 70–99)
GLUCOSE SERPL-MCNC: 123 MG/DL — HIGH (ref 70–99)
GLUCOSE UR QL: NEGATIVE MG/DL — SIGNIFICANT CHANGE UP
HAV IGM SER-ACNC: SIGNIFICANT CHANGE UP
HBV CORE IGM SER-ACNC: SIGNIFICANT CHANGE UP
HBV SURFACE AG SER-ACNC: SIGNIFICANT CHANGE UP
HCO3 BLDA-SCNC: 19 MMOL/L — LOW (ref 21–28)
HCT VFR BLD CALC: 40.2 % — SIGNIFICANT CHANGE UP (ref 34.5–45)
HCV AB S/CO SERPL IA: 0.39 S/CO — SIGNIFICANT CHANGE UP (ref 0–0.79)
HCV AB SERPL-IMP: SIGNIFICANT CHANGE UP
HGB BLD-MCNC: 12.8 G/DL — SIGNIFICANT CHANGE UP (ref 11.5–15.5)
HOROWITZ INDEX BLDA+IHG-RTO: 21 — SIGNIFICANT CHANGE UP
HYALINE CASTS # UR AUTO: PRESENT
IMM GRANULOCYTES NFR BLD AUTO: 0.5 % — SIGNIFICANT CHANGE UP (ref 0–0.9)
INR BLD: 1.67 RATIO — HIGH (ref 0.85–1.16)
IRON SATN MFR SERPL: 29 UG/DL — LOW (ref 40–150)
IRON SATN MFR SERPL: 7 % — LOW (ref 15–50)
KETONES UR-MCNC: NEGATIVE MG/DL — SIGNIFICANT CHANGE UP
LACTATE SERPL-SCNC: 4.3 MMOL/L — CRITICAL HIGH (ref 0.7–2)
LACTATE SERPL-SCNC: 4.6 MMOL/L — CRITICAL HIGH (ref 0.7–2)
LDH SERPL L TO P-CCNC: 1600 U/L — HIGH (ref 120–225)
LEUKOCYTE ESTERASE UR-ACNC: ABNORMAL
LYMPHOCYTES # BLD AUTO: 0.97 K/UL — LOW (ref 1–3.3)
LYMPHOCYTES # BLD AUTO: 11.6 % — LOW (ref 13–44)
MAGNESIUM SERPL-MCNC: 2.8 MG/DL — HIGH (ref 1.6–2.6)
MAGNESIUM SERPL-MCNC: 3 MG/DL — HIGH (ref 1.6–2.6)
MCHC RBC-ENTMCNC: 28.8 PG — SIGNIFICANT CHANGE UP (ref 27–34)
MCHC RBC-ENTMCNC: 31.8 G/DL — LOW (ref 32–36)
MCV RBC AUTO: 90.5 FL — SIGNIFICANT CHANGE UP (ref 80–100)
MONOCYTES # BLD AUTO: 0.8 K/UL — SIGNIFICANT CHANGE UP (ref 0–0.9)
MONOCYTES NFR BLD AUTO: 9.6 % — SIGNIFICANT CHANGE UP (ref 2–14)
NEUTROPHILS # BLD AUTO: 6.53 K/UL — SIGNIFICANT CHANGE UP (ref 1.8–7.4)
NEUTROPHILS NFR BLD AUTO: 78 % — HIGH (ref 43–77)
NITRITE UR-MCNC: NEGATIVE — SIGNIFICANT CHANGE UP
NRBC BLD AUTO-RTO: 0 /100 WBCS — SIGNIFICANT CHANGE UP (ref 0–0)
NT-PROBNP SERPL-SCNC: HIGH PG/ML (ref 0–450)
PCO2 BLDA: 29 MMHG — LOW (ref 32–35)
PH BLDA: 7.42 — SIGNIFICANT CHANGE UP (ref 7.35–7.45)
PH UR: 5 — SIGNIFICANT CHANGE UP (ref 5–8)
PHOSPHATE SERPL-MCNC: 5.1 MG/DL — HIGH (ref 2.5–4.5)
PHOSPHATE SERPL-MCNC: 5.4 MG/DL — HIGH (ref 2.5–4.5)
PLATELET # BLD AUTO: 121 K/UL — LOW (ref 150–400)
PO2 BLDA: 90 MMHG — SIGNIFICANT CHANGE UP (ref 83–108)
POTASSIUM SERPL-MCNC: 5.5 MMOL/L — HIGH (ref 3.5–5.3)
POTASSIUM SERPL-MCNC: 5.5 MMOL/L — HIGH (ref 3.5–5.3)
POTASSIUM SERPL-SCNC: 5.5 MMOL/L — HIGH (ref 3.5–5.3)
POTASSIUM SERPL-SCNC: 5.5 MMOL/L — HIGH (ref 3.5–5.3)
PROT SERPL-MCNC: 7.1 G/DL — SIGNIFICANT CHANGE UP (ref 6–8.3)
PROT SERPL-MCNC: 7.2 G/DL — SIGNIFICANT CHANGE UP (ref 6–8.3)
PROT UR-MCNC: 100 MG/DL
PROTHROM AB SERPL-ACNC: 19.4 SEC — HIGH (ref 9.9–13.4)
RBC # BLD: 4.44 M/UL — SIGNIFICANT CHANGE UP (ref 3.8–5.2)
RBC # FLD: 17.7 % — HIGH (ref 10.3–14.5)
RBC CASTS # UR COMP ASSIST: 3 /HPF — SIGNIFICANT CHANGE UP (ref 0–4)
SAO2 % BLDA: 98 % — SIGNIFICANT CHANGE UP
SODIUM SERPL-SCNC: 131 MMOL/L — LOW (ref 135–145)
SODIUM SERPL-SCNC: 131 MMOL/L — LOW (ref 135–145)
SP GR SPEC: 1.02 — SIGNIFICANT CHANGE UP (ref 1–1.03)
SPECIMEN SOURCE: SIGNIFICANT CHANGE UP
TIBC SERPL-MCNC: 414 UG/DL — SIGNIFICANT CHANGE UP (ref 250–450)
TROPONIN I, HIGH SENSITIVITY RESULT: 41.1 NG/L — SIGNIFICANT CHANGE UP
TROPONIN I, HIGH SENSITIVITY RESULT: 49.6 NG/L — SIGNIFICANT CHANGE UP
TSH SERPL-MCNC: 3.01 UU/ML — SIGNIFICANT CHANGE UP (ref 0.34–4.82)
UIBC SERPL-MCNC: 385 UG/DL — HIGH (ref 110–370)
UROBILINOGEN FLD QL: 1 MG/DL — SIGNIFICANT CHANGE UP (ref 0.2–1)
WBC # BLD: 8.37 K/UL — SIGNIFICANT CHANGE UP (ref 3.8–10.5)
WBC # FLD AUTO: 8.37 K/UL — SIGNIFICANT CHANGE UP (ref 3.8–10.5)
WBC UR QL: 4 /HPF — SIGNIFICANT CHANGE UP (ref 0–5)

## 2025-01-01 PROCEDURE — 84100 ASSAY OF PHOSPHORUS: CPT

## 2025-01-01 PROCEDURE — 87040 BLOOD CULTURE FOR BACTERIA: CPT

## 2025-01-01 PROCEDURE — 86255 FLUORESCENT ANTIBODY SCREEN: CPT

## 2025-01-01 PROCEDURE — 83880 ASSAY OF NATRIURETIC PEPTIDE: CPT

## 2025-01-01 PROCEDURE — 99285 EMERGENCY DEPT VISIT HI MDM: CPT

## 2025-01-01 PROCEDURE — 83605 ASSAY OF LACTIC ACID: CPT

## 2025-01-01 PROCEDURE — 71045 X-RAY EXAM CHEST 1 VIEW: CPT | Mod: 26

## 2025-01-01 PROCEDURE — 82787 IGG 1 2 3 OR 4 EACH: CPT

## 2025-01-01 PROCEDURE — 81001 URINALYSIS AUTO W/SCOPE: CPT

## 2025-01-01 PROCEDURE — 36415 COLL VENOUS BLD VENIPUNCTURE: CPT

## 2025-01-01 PROCEDURE — 99223 1ST HOSP IP/OBS HIGH 75: CPT

## 2025-01-01 PROCEDURE — 86038 ANTINUCLEAR ANTIBODIES: CPT

## 2025-01-01 PROCEDURE — 76705 ECHO EXAM OF ABDOMEN: CPT | Mod: 26

## 2025-01-01 PROCEDURE — 74176 CT ABD & PELVIS W/O CONTRAST: CPT | Mod: 26

## 2025-01-01 PROCEDURE — 85025 COMPLETE CBC W/AUTO DIFF WBC: CPT

## 2025-01-01 PROCEDURE — 93010 ELECTROCARDIOGRAM REPORT: CPT

## 2025-01-01 PROCEDURE — 84484 ASSAY OF TROPONIN QUANT: CPT

## 2025-01-01 PROCEDURE — 83735 ASSAY OF MAGNESIUM: CPT

## 2025-01-01 PROCEDURE — 85730 THROMBOPLASTIN TIME PARTIAL: CPT

## 2025-01-01 PROCEDURE — 80053 COMPREHEN METABOLIC PANEL: CPT

## 2025-01-01 PROCEDURE — 82803 BLOOD GASES ANY COMBINATION: CPT

## 2025-01-01 PROCEDURE — 76705 ECHO EXAM OF ABDOMEN: CPT

## 2025-01-01 PROCEDURE — 84443 ASSAY THYROID STIM HORMONE: CPT

## 2025-01-01 PROCEDURE — 86381 MITOCHONDRIAL ANTIBODY EACH: CPT

## 2025-01-01 PROCEDURE — 74176 CT ABD & PELVIS W/O CONTRAST: CPT | Mod: MC

## 2025-01-01 PROCEDURE — 82728 ASSAY OF FERRITIN: CPT

## 2025-01-01 PROCEDURE — 83540 ASSAY OF IRON: CPT

## 2025-01-01 PROCEDURE — 86664 EPSTEIN-BARR NUCLEAR ANTIGEN: CPT

## 2025-01-01 PROCEDURE — 80074 ACUTE HEPATITIS PANEL: CPT

## 2025-01-01 PROCEDURE — 86376 MICROSOMAL ANTIBODY EACH: CPT

## 2025-01-01 PROCEDURE — 82248 BILIRUBIN DIRECT: CPT

## 2025-01-01 PROCEDURE — 87086 URINE CULTURE/COLONY COUNT: CPT

## 2025-01-01 PROCEDURE — 83550 IRON BINDING TEST: CPT

## 2025-01-01 PROCEDURE — 99233 SBSQ HOSP IP/OBS HIGH 50: CPT | Mod: GC

## 2025-01-01 PROCEDURE — 99238 HOSP IP/OBS DSCHRG MGMT 30/<: CPT | Mod: GC

## 2025-01-01 PROCEDURE — 82784 ASSAY IGA/IGD/IGG/IGM EACH: CPT

## 2025-01-01 PROCEDURE — 86663 EPSTEIN-BARR ANTIBODY: CPT

## 2025-01-01 PROCEDURE — 82962 GLUCOSE BLOOD TEST: CPT

## 2025-01-01 PROCEDURE — 86665 EPSTEIN-BARR CAPSID VCA: CPT

## 2025-01-01 PROCEDURE — 71045 X-RAY EXAM CHEST 1 VIEW: CPT

## 2025-01-01 PROCEDURE — 85610 PROTHROMBIN TIME: CPT

## 2025-01-01 PROCEDURE — 93005 ELECTROCARDIOGRAM TRACING: CPT

## 2025-01-01 PROCEDURE — 99291 CRITICAL CARE FIRST HOUR: CPT | Mod: GC

## 2025-01-01 PROCEDURE — 83615 LACTATE (LD) (LDH) ENZYME: CPT

## 2025-01-01 PROCEDURE — 96374 THER/PROPH/DIAG INJ IV PUSH: CPT

## 2025-01-01 RX ORDER — LORAZEPAM 4 MG/ML
0.5 VIAL (ML) INJECTION ONCE
Refills: 0 | Status: DISCONTINUED | OUTPATIENT
Start: 2025-01-01 | End: 2025-01-01

## 2025-01-01 RX ORDER — POLYETHYLENE GLYCOL 3350 17 G/17G
17 POWDER, FOR SOLUTION ORAL DAILY
Refills: 0 | Status: DISCONTINUED | OUTPATIENT
Start: 2025-01-01 | End: 2025-01-01

## 2025-01-01 RX ORDER — ONDANSETRON HCL/PF 4 MG/2 ML
4 VIAL (ML) INJECTION EVERY 6 HOURS
Refills: 0 | Status: DISCONTINUED | OUTPATIENT
Start: 2025-01-01 | End: 2025-01-01

## 2025-01-01 RX ORDER — LORAZEPAM 4 MG/ML
0.5 VIAL (ML) INJECTION EVERY 8 HOURS
Refills: 0 | Status: DISCONTINUED | OUTPATIENT
Start: 2025-01-01 | End: 2025-01-01

## 2025-01-01 RX ORDER — CEFAZOLIN SODIUM IN 0.9 % NACL 3 G/100 ML
1000 INTRAVENOUS SOLUTION, PIGGYBACK (ML) INTRAVENOUS
Refills: 0 | Status: DISCONTINUED | OUTPATIENT
Start: 2025-01-01 | End: 2025-01-01

## 2025-01-01 RX ORDER — CEFTRIAXONE 500 MG/1
1000 INJECTION, POWDER, FOR SOLUTION INTRAMUSCULAR; INTRAVENOUS ONCE
Refills: 0 | Status: COMPLETED | OUTPATIENT
Start: 2025-01-01 | End: 2025-01-01

## 2025-01-01 RX ORDER — GLYCOPYRROLATE 0.2 MG/ML
0.4 INJECTION INTRAMUSCULAR; INTRAVENOUS
Refills: 0 | Status: DISCONTINUED | OUTPATIENT
Start: 2025-01-01 | End: 2025-01-01

## 2025-01-01 RX ORDER — CEFAZOLIN SODIUM IN 0.9 % NACL 3 G/100 ML
INTRAVENOUS SOLUTION, PIGGYBACK (ML) INTRAVENOUS
Refills: 0 | Status: DISCONTINUED | OUTPATIENT
Start: 2025-01-01 | End: 2025-01-01

## 2025-01-01 RX ORDER — DOBUTAMINE 250 MG/20ML
5 INJECTION INTRAVENOUS
Qty: 500 | Refills: 0 | Status: DISCONTINUED | OUTPATIENT
Start: 2025-01-01 | End: 2025-01-01

## 2025-01-01 RX ORDER — DIGOXIN 250 UG/1
250 TABLET ORAL ONCE
Refills: 0 | Status: DISCONTINUED | OUTPATIENT
Start: 2025-01-01 | End: 2025-01-01

## 2025-01-01 RX ORDER — SENNA 187 MG
2 TABLET ORAL AT BEDTIME
Refills: 0 | Status: DISCONTINUED | OUTPATIENT
Start: 2025-01-01 | End: 2025-01-01

## 2025-01-01 RX ORDER — HEPARIN SODIUM 1000 [USP'U]/ML
5000 INJECTION INTRAVENOUS; SUBCUTANEOUS EVERY 8 HOURS
Refills: 0 | Status: DISCONTINUED | OUTPATIENT
Start: 2025-01-01 | End: 2025-01-01

## 2025-01-01 RX ADMIN — Medication 1 MILLIGRAM(S): at 02:07

## 2025-01-01 RX ADMIN — CEFTRIAXONE 100 MILLIGRAM(S): 500 INJECTION, POWDER, FOR SOLUTION INTRAMUSCULAR; INTRAVENOUS at 16:15

## 2025-01-01 RX ADMIN — GLYCOPYRROLATE 0.4 MILLIGRAM(S): 0.2 INJECTION INTRAMUSCULAR; INTRAVENOUS at 02:07

## 2025-01-01 RX ADMIN — Medication 1 MILLIGRAM(S): at 05:23

## 2025-01-01 RX ADMIN — Medication 1 MILLIGRAM(S): at 21:55

## 2025-01-01 RX ADMIN — Medication 1 MILLIGRAM(S): at 05:22

## 2025-01-01 RX ADMIN — DOBUTAMINE 5.93 MICROGRAM(S)/KG/MIN: 250 INJECTION INTRAVENOUS at 00:15

## 2025-01-01 RX ADMIN — Medication 1 MILLIGRAM(S): at 21:52

## 2025-01-01 RX ADMIN — Medication 4 MILLIGRAM(S): at 02:28

## 2025-01-01 RX ADMIN — Medication 1 MILLIGRAM(S): at 02:58

## 2025-01-01 RX ADMIN — Medication 500 MILLILITER(S): at 16:15

## 2025-01-01 RX ADMIN — Medication 100 MILLIGRAM(S): at 01:41

## 2025-01-01 RX ADMIN — Medication 1 MILLIGRAM(S): at 02:28

## 2025-03-17 LAB
IGG FLD-MCNC: 1583 MG/DL — SIGNIFICANT CHANGE UP (ref 610–1660)
IGG1 SER-MCNC: 853 MG/DL — SIGNIFICANT CHANGE UP (ref 240–1118)
IGG2 SER-MCNC: 325 MG/DL — SIGNIFICANT CHANGE UP (ref 124–549)
IGG3 SER-MCNC: 44.8 MG/DL — SIGNIFICANT CHANGE UP (ref 15–102)
IGG4 SER-MCNC: 89.5 MG/DL — SIGNIFICANT CHANGE UP (ref 1–123)
MITOCHONDRIA M2 AB SER IA-ACNC: SIGNIFICANT CHANGE UP
SMOOTH MUSCLE AB SER-ACNC: SIGNIFICANT CHANGE UP

## 2025-03-18 LAB — LKM AB SER-ACNC: <20.1 UNITS — SIGNIFICANT CHANGE UP (ref 0–20)

## 2025-03-19 LAB
ANA TITR SER: NEGATIVE — SIGNIFICANT CHANGE UP
CULTURE RESULTS: SIGNIFICANT CHANGE UP
CULTURE RESULTS: SIGNIFICANT CHANGE UP
SPECIMEN SOURCE: SIGNIFICANT CHANGE UP
SPECIMEN SOURCE: SIGNIFICANT CHANGE UP